# Patient Record
Sex: MALE | Race: WHITE
[De-identification: names, ages, dates, MRNs, and addresses within clinical notes are randomized per-mention and may not be internally consistent; named-entity substitution may affect disease eponyms.]

---

## 2018-02-05 ENCOUNTER — HOSPITAL ENCOUNTER (EMERGENCY)
Dept: HOSPITAL 61 - ER | Age: 38
Discharge: HOME | End: 2018-02-05
Payer: SELF-PAY

## 2018-02-05 DIAGNOSIS — R19.7: ICD-10-CM

## 2018-02-05 DIAGNOSIS — F15.10: ICD-10-CM

## 2018-02-05 DIAGNOSIS — R11.0: ICD-10-CM

## 2018-02-05 DIAGNOSIS — R10.32: Primary | ICD-10-CM

## 2018-02-05 DIAGNOSIS — F17.210: ICD-10-CM

## 2018-02-05 LAB
ACETAMIN: < 2 MCG/ML (ref 10–30)
ADD MAN DIFF?: NO
ALBUMIN SERPL-MCNC: 4.3 G/DL (ref 3.4–5)
ALP SERPL-CCNC: 114 U/L (ref 46–116)
ALT (SGPT): 25 U/L (ref 16–63)
AMPHETAMINE/METHAMPHETAMINE: (no result)
ANION GAP SERPL CALC-SCNC: 8 MMOL/L (ref 6–14)
AST SERPL-CCNC: 16 U/L (ref 15–37)
BACTERIA,URINE: 0 /HPF
BARBITURATES: (no result)
BASO #: 0.1 X10^3/UL (ref 0–0.2)
BASO %: 1 % (ref 0–3)
BENZODIAZEPINES: (no result)
BILIRUB DIRECT SERPL-MCNC: 0.1 MG/DL (ref 0–0.2)
BILIRUBIN,URINE: NEGATIVE
BLOOD UREA NITROGEN: 15 MG/DL (ref 8–26)
CALCIUM: 10.2 MG/DL (ref 8.5–10.1)
CANNABINOIDS: (no result)
CHLORIDE: 101 MMOL/L (ref 98–107)
CLARITY,URINE: CLEAR
CO2 SERPL-SCNC: 31 MMOL/L (ref 21–32)
COCAINE: (no result)
COLOR,URINE: YELLOW
CREAT SERPL-MCNC: 0.9 MG/DL (ref 0.7–1.3)
EOS #: 0.3 X10^3/UL (ref 0–0.7)
EOS %: 4 % (ref 0–3)
ETHANOL, URINE: (no result)
ETHANOL: < 10 MG/DL (ref 0–10)
GFR SERPLBLD BASED ON 1.73 SQ M-ARVRAT: 93.9 ML/MIN
GLUCOSE SERPL-MCNC: 75 MG/DL (ref 70–99)
GLUCOSE,URINE: NEGATIVE MG/DL
HCG SERPL-ACNC: 9.1 X10^3/UL (ref 4–11)
HEMATOCRIT: 49 % (ref 39–53)
HEMOGLOBIN: 16.3 G/DL (ref 13–17.5)
LIPASE: 162 U/L (ref 73–393)
LYMPH #: 3 X10^3/UL (ref 1–4.8)
LYMPH %: 33 % (ref 24–48)
MAGNESIUM: 2 MG/DL (ref 1.8–2.4)
MEAN CORPUSCULAR HEMOGLOBIN: 30 PG (ref 25–35)
MEAN CORPUSCULAR HGB CONC: 33 G/DL (ref 31–37)
MEAN CORPUSCULAR VOLUME: 90 FL (ref 79–100)
METHADONE: (no result)
MONO #: 0.6 X10^3/UL (ref 0–1.1)
MONO %: 7 % (ref 0–9)
NEUT #: 5.2 X10^3UL (ref 1.8–7.7)
NEUT %: 57 % (ref 31–73)
NITRITE,URINE: NEGATIVE
OPIATES: (no result)
PH,URINE: 6
PHENCYCLIDINE: (no result)
PLATELET COUNT: 319 X10^3/UL (ref 140–400)
POTASSIUM SERPL-SCNC: 4.2 MMOL/L (ref 3.5–5.1)
PROTEIN,URINE: NEGATIVE MG/DL
RBC,URINE: 0 /HPF (ref 0–2)
RED BLOOD COUNT: 5.44 X10^6/UL (ref 4.3–5.7)
RED CELL DISTRIBUTION WIDTH: 13.9 % (ref 11.5–14.5)
SALIC: < 2.8 MG/DL (ref 2.8–20)
SODIUM: 140 MMOL/L (ref 136–145)
SPECIFIC GRAVITY,URINE: <=1.005
THYROID STIM HORMONE (TSH): 8.45 UIU/ML (ref 0.36–3.74)
TOTAL BILIRUBIN: 0.2 MG/DL (ref 0.2–1)
TOTAL PROTEIN: 8.9 G/DL (ref 6.4–8.2)
UROBILINOGEN,URINE: 0.2 MG/DL
WBC,URINE: 0 /HPF (ref 0–4)

## 2018-02-05 PROCEDURE — 74177 CT ABD & PELVIS W/CONTRAST: CPT

## 2018-02-05 PROCEDURE — 99285 EMERGENCY DEPT VISIT HI MDM: CPT

## 2018-02-05 PROCEDURE — 93005 ELECTROCARDIOGRAM TRACING: CPT

## 2018-02-05 PROCEDURE — 83735 ASSAY OF MAGNESIUM: CPT

## 2018-02-05 PROCEDURE — 80048 BASIC METABOLIC PNL TOTAL CA: CPT

## 2018-02-05 PROCEDURE — 80307 DRUG TEST PRSMV CHEM ANLYZR: CPT

## 2018-02-05 PROCEDURE — 80329 ANALGESICS NON-OPIOID 1 OR 2: CPT

## 2018-02-05 PROCEDURE — 96374 THER/PROPH/DIAG INJ IV PUSH: CPT

## 2018-02-05 PROCEDURE — 96361 HYDRATE IV INFUSION ADD-ON: CPT

## 2018-02-05 PROCEDURE — 36415 COLL VENOUS BLD VENIPUNCTURE: CPT

## 2018-02-05 PROCEDURE — 96375 TX/PRO/DX INJ NEW DRUG ADDON: CPT

## 2018-02-05 PROCEDURE — 80076 HEPATIC FUNCTION PANEL: CPT

## 2018-02-05 PROCEDURE — 83690 ASSAY OF LIPASE: CPT

## 2018-02-05 PROCEDURE — 85025 COMPLETE CBC W/AUTO DIFF WBC: CPT

## 2018-02-05 PROCEDURE — 84443 ASSAY THYROID STIM HORMONE: CPT

## 2018-02-05 PROCEDURE — 81001 URINALYSIS AUTO W/SCOPE: CPT

## 2018-02-05 RX ADMIN — IOHEXOL 1 ML: 300 INJECTION, SOLUTION INTRAVENOUS at 15:30

## 2018-02-05 RX ADMIN — KETOROLAC TROMETHAMINE 1 MG: 30 INJECTION, SOLUTION INTRAMUSCULAR at 16:11

## 2018-02-05 RX ADMIN — ONDANSETRON 1 MG: 2 INJECTION INTRAMUSCULAR; INTRAVENOUS at 16:12

## 2018-02-05 RX ADMIN — BACITRACIN 1 MLS/HR: 5000 INJECTION, POWDER, FOR SOLUTION INTRAMUSCULAR at 16:11

## 2018-10-29 ENCOUNTER — HOSPITAL ENCOUNTER (EMERGENCY)
Dept: HOSPITAL 61 - ER | Age: 38
Discharge: HOME | End: 2018-10-29
Payer: SELF-PAY

## 2018-10-29 VITALS — WEIGHT: 180 LBS | BODY MASS INDEX: 26.66 KG/M2 | HEIGHT: 69 IN

## 2018-10-29 VITALS — DIASTOLIC BLOOD PRESSURE: 80 MMHG | SYSTOLIC BLOOD PRESSURE: 123 MMHG

## 2018-10-29 DIAGNOSIS — J32.1: ICD-10-CM

## 2018-10-29 DIAGNOSIS — Z88.5: ICD-10-CM

## 2018-10-29 DIAGNOSIS — J32.0: Primary | ICD-10-CM

## 2018-10-29 PROCEDURE — 87880 STREP A ASSAY W/OPTIC: CPT

## 2018-10-29 PROCEDURE — 99283 EMERGENCY DEPT VISIT LOW MDM: CPT

## 2018-10-29 PROCEDURE — 87070 CULTURE OTHR SPECIMN AEROBIC: CPT

## 2018-10-29 NOTE — PHYS DOC
Past Medical History


Past Medical History:  No Pertinent History


Additional Past Medical Histor:  DRUG ABUSE


Past Surgical History:  No Surgical History


Additional Past Surgical Histo:  I&D RIGHT ARM


Alcohol Use:  Sober


Drug Use:  Methamphetamine





Adult General


Chief Complaint


Chief Complaint:  Congestion





HPI


HPI





Patient is a 37  year old male who presents with increasing sinus pain over the 

past 2 weeks. The patient states that he has now developed a sore throat. He 

states that it initially started on the left side of his face and has now moved 

over to include the right. He has had thick nasal drainage. He states that 

there are no mitigating factors.





Review of Systems


Review of Systems





Constitutional: Denies fever or chills []


Eyes: Denies change in visual acuity, redness, or eye pain []


HENT: See history of present illness


Respiratory: Denies cough or shortness of breath []


Cardiovascular: No additional information not addressed in HPI []


Neurologic: Denies headache, focal weakness or sensory changes []


Endocrine: Denies polyuria or polydipsia []





All other systems were reviewed and found to be within normal limits, except as 

documented in this note.





Allergies


Allergies





Allergies








Coded Allergies Type Severity Reaction Last Updated Verified


 


  codeine Allergy Intermediate CHEST PAIN 2/5/18 Yes











Physical Exam


Physical Exam





Constitutional: Well developed, well nourished, no acute distress, non-toxic 

appearance. []


HENT: Normocephalic, atraumatic, bilateral and panic membranes normal, 

pharyngeal erythema with no exudate, maxillary and frontal sinus tenderness to 

palpation


Eyes: PERRLA, EOMI, conjunctiva normal, no discharge. [] 


Neck: Normal range of motion, no tenderness, supple, no stridor. [] 


Cardiovascular:Heart rate regular rhythm, no murmur []


Lungs & Thorax:  Bilateral breath sounds clear to auscultation []


Neurologic: Alert and oriented X 3, normal motor function, normal sensory 

function, no focal deficits noted. []


Psychologic: Affect normal, judgement normal, mood normal. []





Current Patient Data


Vital Signs





 Vital Signs








  Date Time  Temp Pulse Resp B/P (MAP) Pulse Ox O2 Delivery O2 Flow Rate FiO2


 


10/29/18 15:20 99.1 89  123/80 (94) 97 Room Air  





 99.1       








Lab Values





 Laboratory Tests








Test


 10/29/18


15:56


 


Group A Streptococcus Rapid


 Negative


(NEGATIVE)











EKG


EKG


[]





Radiology/Procedures


Radiology/Procedures


[]





Course & Med Decision Making


Course & Med Decision Making


Pertinent Labs and Imaging studies reviewed. (See chart for details)





[]The patient's rapid strep was negative. 








Staff Physician Addendum:


I was working in the ER during the course of this patient's visit.  I was 

available for consultation as needed, but I was not directly involved in the 

care of this patient.    








Dragon Disclaimer


Dragon Disclaimer


This electronic medical record was generated, in whole or in part, using a 

voice recognition dictation system.





Departure


Departure


Impression:  


 Primary Impression:  


 Sinusitis


Disposition:  01 HOME, SELF-CARE


Condition:  STABLE


Referrals:  


NO PCP (PCP)


Patient Instructions:  Sinusitis





Additional Instructions:  


Take the antibiotic as prescribed. Follow-up with your primary care provider in 

one week if not improving or return to the emergency department if worsening. 

Sinus rinses such as a Neti pot may be very helpful in helping to clear your 

condition and provide comfort. You may also use ibuprofen or Tylenol.


Scripts


Doxycycline Hyclate (DOXYCYCLINE HYCLATE) 100 Mg Capsule


1 CAP PO BID, #[id number] CAP


   Prov: JOVI ALTMAN         10/29/18











JOVI ALTMAN Oct 29, 2018 15:40


EYAL HINTON MD Oct 30, 2018 18:11